# Patient Record
(demographics unavailable — no encounter records)

---

## 2024-10-16 NOTE — END OF VISIT
[Time Spent: ___ minutes] : I have spent [unfilled] minutes of time on the encounter which excludes teaching and separately reported services. [FreeTextEntry3] :  I, Jad Whitfield, am scribing for and in the presence of Dr. Dana Mcgee in the following sections: HISTORY OF PRESENT ILLNESS; REVIEW OF SYSTEMS; PHYSICAL EXAM; ASSESSMENT/ PLAN.I, Dana Mcgee, personally performed the services described in the documentation, reviewed the documentation recorded by the scribe in my presence, and it accurately and completely records my words and actions. 10/16/24

## 2024-10-16 NOTE — REVIEW OF SYSTEMS
[Recent Weight Loss (___ Lbs)] : recent weight loss: [unfilled] lbs [Diarrhea] : diarrhea [Negative] : Heme/Lymph

## 2024-10-16 NOTE — HISTORY OF PRESENT ILLNESS
[FreeTextEntry1] : Ms. COREY REYES is a 65 year old female coming for follow up DM2, hypothyroidism, obesity Doing very well with diet and exercise on Ozempic losing weight  type 2 DM for 10   years         on Levemir 38 units qam  Metformin ER 500mg bid causes diarrhea resolved  was on Farxiga 10mg qam  stopped long time ago as  was giving her palpitations  on Ozempic 1mg q week loosing weight tolerating it well exept the day after has diarrhea only then resolved  feels good no active complains today has forgotten her meter at home has a CGM but no reader reports one time 79 at night 4am started beeping otherwise reports 112's   Last HgA1c on 3/22     was 6.1 1/23 was up to 7, was 13 years ago with Dr Sukh Sheth BS none has style javier 2 forgot the reader at home  Microvascular complications: Nephropathy denies  Neuropathy  symptoms, denies  Retinopathy  denies       last eye exam January 2022 due to have one   Macrovascular complications:  HTN at goal on present meds CAD/CVA denies  Lipids  at goal not   on  meds with  LFT's denies   hypothyroidism on Synthroid for over 20 yrs same dose for long time after PRINCE therapy   7/12/24 Does go to the gym regularly works with a  Started to use freestyle javier 3 like see that it is smaller forgot her reader at home Reports being more hungry did not increase the Levemir takes 36 units in the morning despite my advice to decrease the dose last visit  10/16/24 Patient is doing well. She recently had a covid shot and did not feel well afterwards. Will send copy of note and labwork to primary.  Labwork

## 2024-10-16 NOTE — PHYSICAL EXAM
[Alert] : alert [Well Nourished] : well nourished [Obese] : obese [No Acute Distress] : no acute distress [Well Developed] : well developed [Normal Voice/Communication] : normal voice communication [Normal Sclera/Conjunctiva] : normal sclera/conjunctiva [EOMI] : extra ocular movement intact [No Proptosis] : no proptosis [Normal Outer Ear/Nose] : the ears and nose were normal in appearance [Normal Hearing] : hearing was normal [Normal Lips/Gums] : the lips and gums were normal [Normal Oropharynx] : the oropharynx was normal [No Thyroid Nodules] : no palpable thyroid nodules [No Respiratory Distress] : no respiratory distress [No Accessory Muscle Use] : no accessory muscle use [Normal Rate and Effort] : normal respiratory rate and effort [Clear to Auscultation] : lungs were clear to auscultation bilaterally [Normal S1, S2] : normal S1 and S2 [Normal Rate] : heart rate was normal [Regular Rhythm] : with a regular rhythm [No Edema] : no peripheral edema [Pedal Pulses Normal] : the pedal pulses are present [Normal Bowel Sounds] : normal bowel sounds [Not Tender] : non-tender [Not Distended] : not distended [Soft] : abdomen soft [Normal Anterior Cervical Nodes] : no anterior cervical lymphadenopathy [No Spinal Tenderness] : no spinal tenderness [Spine Straight] : spine straight [No Stigmata of Cushings Syndrome] : no stigmata of Cushings Syndrome [Normal Gait] : normal gait [No Involuntary Movements] : no involuntary movements were seen [Normal Strength/Tone] : muscle strength and tone were normal [No Rash] : no rash [Normal Reflexes] : deep tendon reflexes were 2+ and symmetric [No Tremors] : no tremors [Oriented x3] : oriented to person, place, and time [Acanthosis Nigricans] : no acanthosis nigricans [de-identified] : 2 out of 6 systolic ejection murmur aortic area [de-identified] : Mildly enlarged thyroid on exam

## 2025-02-14 NOTE — PHYSICAL EXAM
[Alert] : alert [Well Nourished] : well nourished [Obese] : obese [No Acute Distress] : no acute distress [Well Developed] : well developed [Normal Voice/Communication] : normal voice communication [Normal Sclera/Conjunctiva] : normal sclera/conjunctiva [EOMI] : extra ocular movement intact [No Proptosis] : no proptosis [Normal Outer Ear/Nose] : the ears and nose were normal in appearance [Normal Hearing] : hearing was normal [Normal Lips/Gums] : the lips and gums were normal [Normal Oropharynx] : the oropharynx was normal [No Thyroid Nodules] : no palpable thyroid nodules [No Respiratory Distress] : no respiratory distress [No Accessory Muscle Use] : no accessory muscle use [Normal Rate and Effort] : normal respiratory rate and effort [Clear to Auscultation] : lungs were clear to auscultation bilaterally [Normal S1, S2] : normal S1 and S2 [Normal Rate] : heart rate was normal [Regular Rhythm] : with a regular rhythm [No Edema] : no peripheral edema [Pedal Pulses Normal] : the pedal pulses are present [Normal Bowel Sounds] : normal bowel sounds [Not Tender] : non-tender [Not Distended] : not distended [Soft] : abdomen soft [Normal Anterior Cervical Nodes] : no anterior cervical lymphadenopathy [No Spinal Tenderness] : no spinal tenderness [Spine Straight] : spine straight [No Stigmata of Cushings Syndrome] : no stigmata of Cushings Syndrome [Normal Gait] : normal gait [No Involuntary Movements] : no involuntary movements were seen [Normal Strength/Tone] : muscle strength and tone were normal [No Rash] : no rash [Normal Reflexes] : deep tendon reflexes were 2+ and symmetric [No Tremors] : no tremors [Oriented x3] : oriented to person, place, and time

## 2025-04-11 NOTE — HISTORY OF PRESENT ILLNESS
[FreeTextEntry1] : Ms. COREY REYES is a 67 year old female coming for follow up DM2, hypothyroidism, obesity Doing very well with diet and exercise on Ozempic losing weight  type 2 DM for 10   years         on Levemir 38 units qam  Metformin ER 500mg bid causes diarrhea resolved  was on Farxiga 10mg qam  stopped long time ago as  was giving her palpitations  on Ozempic 1mg q week loosing weight tolerating it well exept the day after has diarrhea only then resolved  feels good no active complains today has forgotten her meter at home has a CGM but no reader reports one time 79 at night 4am started beeping otherwise reports 112's   Last HgA1c on 3/22     was 6.1 1/23 was up to 7, was 13 years ago with Dr Sukh Sheth BS none has style javier 2 forgot the reader at home  Microvascular complications: Nephropathy denies  Neuropathy  symptoms, denies  Retinopathy  denies       last eye exam January 2022 due to have one   Macrovascular complications:  HTN at goal on present meds CAD/CVA denies  Lipids  at goal not   on  meds with  LFT's denies   hypothyroidism on Synthroid for over 20 yrs same dose for long time after PRINCE therapy   7/12/24 Does go to the gym regularly works with a  Started to use freestyle javier 3 like see that it is smaller forgot her reader at home Reports being more hungry did not increase the Levemir takes 36 units in the morning despite my advice to decrease the dose last visit  10/16/24 Patient is doing well. She recently had a covid shot and did not feel well afterwards. Will send copy of note and labwork to primary.   02/14/25  Patient is doing well, no active complaints. She is still consistent doing personal training and enjoys it, she sees Russell Gordon and  Aubrey Souza at [facility] [employer] [employer] [facility] [employer].    Patient states she sleeps better after starting personal training.    500mg of Metformin twice a day was giving her diarrhea. She cut it down to 500 mg once a day herself but diarrhea still persisted. I advised that she can stop taking it since her sugars are so good. Continue Ozempic 1mg q week.   04/09/25 Patient is doing well but c/o hypoglycemic side effects, see below.

## 2025-04-11 NOTE — PHYSICAL EXAM
[Alert] : alert [Well Nourished] : well nourished [Obese] : obese [No Acute Distress] : no acute distress [Well Developed] : well developed [Normal Voice/Communication] : normal voice communication [Normal Sclera/Conjunctiva] : normal sclera/conjunctiva [EOMI] : extra ocular movement intact [No Proptosis] : no proptosis [Normal Outer Ear/Nose] : the ears and nose were normal in appearance [Normal Hearing] : hearing was normal [Normal Lips/Gums] : the lips and gums were normal [Normal Oropharynx] : the oropharynx was normal [No Thyroid Nodules] : no palpable thyroid nodules [No Respiratory Distress] : no respiratory distress [No Accessory Muscle Use] : no accessory muscle use [Normal Rate and Effort] : normal respiratory rate and effort [Clear to Auscultation] : lungs were clear to auscultation bilaterally [Normal S1, S2] : normal S1 and S2 [Normal Rate] : heart rate was normal [Regular Rhythm] : with a regular rhythm [No Edema] : no peripheral edema [Pedal Pulses Normal] : the pedal pulses are present [Normal Bowel Sounds] : normal bowel sounds [Not Tender] : non-tender [Not Distended] : not distended [Soft] : abdomen soft [Normal Anterior Cervical Nodes] : no anterior cervical lymphadenopathy [No Spinal Tenderness] : no spinal tenderness [Spine Straight] : spine straight [No Stigmata of Cushings Syndrome] : no stigmata of Cushings Syndrome [Normal Gait] : normal gait [No Involuntary Movements] : no involuntary movements were seen [Normal Strength/Tone] : muscle strength and tone were normal [No Rash] : no rash [Normal Reflexes] : deep tendon reflexes were 2+ and symmetric [No Tremors] : no tremors [Oriented x3] : oriented to person, place, and time [Acanthosis Nigricans] : no acanthosis nigricans [de-identified] : Mildly enlarged thyroid on exam [de-identified] : 2 out of 6 systolic ejection murmur aortic area

## 2025-04-11 NOTE — PHYSICAL EXAM
[Alert] : alert [Well Nourished] : well nourished [Obese] : obese [No Acute Distress] : no acute distress [Well Developed] : well developed [Normal Voice/Communication] : normal voice communication [Normal Sclera/Conjunctiva] : normal sclera/conjunctiva [EOMI] : extra ocular movement intact [No Proptosis] : no proptosis [Normal Outer Ear/Nose] : the ears and nose were normal in appearance [Normal Hearing] : hearing was normal [Normal Lips/Gums] : the lips and gums were normal [Normal Oropharynx] : the oropharynx was normal [No Thyroid Nodules] : no palpable thyroid nodules [No Respiratory Distress] : no respiratory distress [No Accessory Muscle Use] : no accessory muscle use [Normal Rate and Effort] : normal respiratory rate and effort [Clear to Auscultation] : lungs were clear to auscultation bilaterally [Normal S1, S2] : normal S1 and S2 [Normal Rate] : heart rate was normal [Regular Rhythm] : with a regular rhythm [No Edema] : no peripheral edema [Pedal Pulses Normal] : the pedal pulses are present [Normal Bowel Sounds] : normal bowel sounds [Not Tender] : non-tender [Not Distended] : not distended [Soft] : abdomen soft [Normal Anterior Cervical Nodes] : no anterior cervical lymphadenopathy [No Spinal Tenderness] : no spinal tenderness [Spine Straight] : spine straight [No Stigmata of Cushings Syndrome] : no stigmata of Cushings Syndrome [Normal Gait] : normal gait [No Involuntary Movements] : no involuntary movements were seen [Normal Strength/Tone] : muscle strength and tone were normal [No Rash] : no rash [Normal Reflexes] : deep tendon reflexes were 2+ and symmetric [No Tremors] : no tremors [Oriented x3] : oriented to person, place, and time [Acanthosis Nigricans] : no acanthosis nigricans [de-identified] : Mildly enlarged thyroid on exam [de-identified] : 2 out of 6 systolic ejection murmur aortic area

## 2025-04-11 NOTE — END OF VISIT
[Time Spent: ___ minutes] : I have spent [unfilled] minutes of time on the encounter which excludes teaching and separately reported services. [FreeTextEntry3] :  I, Sharri Christine, am scribing for and in the presence of Dr. Dana Mcgee in the following sections: HISTORY OF PRESENT ILLNESS; REVIEW OF SYSTEMS; PHYSICAL EXAM; ASSESSMENT/ PLAN. I, Dana Mcgee, personally performed the services described in the documentation, reviewed the documentation recorded by the scribe in my presence, and it accurately and completely records my words and actions. 04/09/25

## 2025-07-23 NOTE — PHYSICAL EXAM
[Alert] : alert [Well Nourished] : well nourished [Obese] : obese [No Acute Distress] : no acute distress [Well Developed] : well developed [Normal Voice/Communication] : normal voice communication [Normal Sclera/Conjunctiva] : normal sclera/conjunctiva [EOMI] : extra ocular movement intact [No Proptosis] : no proptosis [Normal Outer Ear/Nose] : the ears and nose were normal in appearance [Normal Hearing] : hearing was normal [Normal Lips/Gums] : the lips and gums were normal [Normal Oropharynx] : the oropharynx was normal [No Thyroid Nodules] : no palpable thyroid nodules [No Respiratory Distress] : no respiratory distress [No Accessory Muscle Use] : no accessory muscle use [Normal Rate and Effort] : normal respiratory rate and effort [Clear to Auscultation] : lungs were clear to auscultation bilaterally [Normal S1, S2] : normal S1 and S2 [Normal Rate] : heart rate was normal [Regular Rhythm] : with a regular rhythm [No Edema] : no peripheral edema [Pedal Pulses Normal] : the pedal pulses are present [Normal Bowel Sounds] : normal bowel sounds [Not Tender] : non-tender [Not Distended] : not distended [Soft] : abdomen soft [Normal Anterior Cervical Nodes] : no anterior cervical lymphadenopathy [No Spinal Tenderness] : no spinal tenderness [Spine Straight] : spine straight [No Stigmata of Cushings Syndrome] : no stigmata of Cushings Syndrome [Normal Gait] : normal gait [No Involuntary Movements] : no involuntary movements were seen [Normal Strength/Tone] : muscle strength and tone were normal [No Rash] : no rash [Normal Reflexes] : deep tendon reflexes were 2+ and symmetric [No Tremors] : no tremors [Oriented x3] : oriented to person, place, and time [Acanthosis Nigricans] : no acanthosis nigricans [de-identified] : Mildly enlarged thyroid on exam [de-identified] : 2 out of 6 systolic ejection murmur aortic area

## 2025-07-23 NOTE — END OF VISIT
[Time Spent: ___ minutes] : I have spent [unfilled] minutes of time on the encounter which excludes teaching and separately reported services. [FreeTextEntry3] :  I, Sharri Christine, am scribing for and in the presence of Dr. Dana Mcgee in the following sections: HISTORY OF PRESENT ILLNESS; REVIEW OF SYSTEMS; PHYSICAL EXAM; ASSESSMENT/ PLAN.I, Dana Mcgee, personally performed the services described in the documentation, reviewed the documentation recorded by the scribe in my presence, and it accurately and completely records my words and actions. 07/16/25

## 2025-07-23 NOTE — PHYSICAL EXAM
[Alert] : alert [Well Nourished] : well nourished [Obese] : obese [No Acute Distress] : no acute distress [Well Developed] : well developed [Normal Voice/Communication] : normal voice communication [Normal Sclera/Conjunctiva] : normal sclera/conjunctiva [EOMI] : extra ocular movement intact [No Proptosis] : no proptosis [Normal Outer Ear/Nose] : the ears and nose were normal in appearance [Normal Hearing] : hearing was normal [Normal Lips/Gums] : the lips and gums were normal [Normal Oropharynx] : the oropharynx was normal [No Thyroid Nodules] : no palpable thyroid nodules [No Respiratory Distress] : no respiratory distress [No Accessory Muscle Use] : no accessory muscle use [Normal Rate and Effort] : normal respiratory rate and effort [Clear to Auscultation] : lungs were clear to auscultation bilaterally [Normal S1, S2] : normal S1 and S2 [Normal Rate] : heart rate was normal [Regular Rhythm] : with a regular rhythm [No Edema] : no peripheral edema [Pedal Pulses Normal] : the pedal pulses are present [Normal Bowel Sounds] : normal bowel sounds [Not Tender] : non-tender [Not Distended] : not distended [Soft] : abdomen soft [Normal Anterior Cervical Nodes] : no anterior cervical lymphadenopathy [No Spinal Tenderness] : no spinal tenderness [Spine Straight] : spine straight [No Stigmata of Cushings Syndrome] : no stigmata of Cushings Syndrome [Normal Gait] : normal gait [No Involuntary Movements] : no involuntary movements were seen [Normal Strength/Tone] : muscle strength and tone were normal [No Rash] : no rash [Normal Reflexes] : deep tendon reflexes were 2+ and symmetric [No Tremors] : no tremors [Oriented x3] : oriented to person, place, and time [Acanthosis Nigricans] : no acanthosis nigricans [de-identified] : Mildly enlarged thyroid on exam [de-identified] : 2 out of 6 systolic ejection murmur aortic area

## 2025-07-23 NOTE — HISTORY OF PRESENT ILLNESS
[FreeTextEntry1] : Ms. COREY REYES is a 67 year old female coming for follow up DM2, hypothyroidism, obesity Doing very well with diet and exercise on Ozempic losing weight  type 2 DM for >10 years         on Tresiba 30 units decreased to 28 units as of 07/16/25 on Ozempic 1mg q week losing weight tolerating it well except the day after has diarrhea only then resolved   Metformin ER 500mg bid; caused diarrhea  was on Farxiga 10mg qam  stopped long time ago as  was giving her palpitations   Last HgA1c 5.8 on in 06/25, 1/23 was up to 7, was 13 years ago with Dr Sukh Sheth BS none has freestyle javier 3  Microvascular complications: Nephropathy denies  Neuropathy  symptoms, denies  Retinopathy Last eye exam December 2024, everything was good. Previous eye problems not diabetes related.   Macrovascular complications:  HTN at goal on present meds CAD/CVA denies  Lipids  Cholesterol is still high, 210. LDL is still high, 105. LDL Goal <70.   on Atorvastatin 80 mg 1 tablet qd qhs, Ezetimibe 10 mg 1 tablet os qd   hypothyroidism on Synthroid for over 20 yrs same dose for long time after PRINCE therapy   07/16/25  Patient is doing well, complains that her sensor beeps too frequently. Sees  1x per week. Also follows with PT.  She was able to change from Levemir to Tresiba.